# Patient Record
Sex: FEMALE | Race: WHITE | NOT HISPANIC OR LATINO | URBAN - METROPOLITAN AREA
[De-identification: names, ages, dates, MRNs, and addresses within clinical notes are randomized per-mention and may not be internally consistent; named-entity substitution may affect disease eponyms.]

---

## 2020-02-21 ENCOUNTER — NEW PATIENT (OUTPATIENT)
Dept: URBAN - METROPOLITAN AREA CLINIC 79 | Facility: CLINIC | Age: 75
End: 2020-02-21

## 2020-02-21 DIAGNOSIS — H52.03: ICD-10-CM

## 2020-02-21 DIAGNOSIS — H25.13: ICD-10-CM

## 2020-02-21 DIAGNOSIS — H43.393: ICD-10-CM

## 2020-02-21 DIAGNOSIS — H52.4: ICD-10-CM

## 2020-02-21 PROCEDURE — 92015 DETERMINE REFRACTIVE STATE: CPT

## 2020-02-21 PROCEDURE — 92004 COMPRE OPH EXAM NEW PT 1/>: CPT

## 2020-02-21 ASSESSMENT — VISUAL ACUITY
OD_PH: 20/20-2
OS_PH: 20/25
OD_SC: 20/40-2
OS_SC: 20/50+1
OS_CC: J2
OD_CC: J2

## 2020-02-21 ASSESSMENT — TONOMETRY
OD_IOP_MMHG: 14
OS_IOP_MMHG: 15

## 2021-03-15 ENCOUNTER — ESTABLISHED COMPREHENSIVE EXAM (OUTPATIENT)
Dept: URBAN - METROPOLITAN AREA CLINIC 79 | Facility: CLINIC | Age: 76
End: 2021-03-15

## 2021-03-15 VITALS — HEIGHT: 55 IN

## 2021-03-15 DIAGNOSIS — H52.4: ICD-10-CM

## 2021-03-15 DIAGNOSIS — H43.393: ICD-10-CM

## 2021-03-15 DIAGNOSIS — H40.013: ICD-10-CM

## 2021-03-15 DIAGNOSIS — H52.03: ICD-10-CM

## 2021-03-15 DIAGNOSIS — H25.13: ICD-10-CM

## 2021-03-15 PROCEDURE — 92133 CPTRZD OPH DX IMG PST SGM ON: CPT

## 2021-03-15 PROCEDURE — 92015 DETERMINE REFRACTIVE STATE: CPT

## 2021-03-15 PROCEDURE — 92014 COMPRE OPH EXAM EST PT 1/>: CPT

## 2021-03-15 PROCEDURE — MISCOPTOS MISCELLANEOUS, OPTOS

## 2021-03-15 ASSESSMENT — VISUAL ACUITY
OD_CC: J2
OS_CC: 20/20
OS_CC: J2
OD_CC: 20/20
OD_SC: 20/30-2
OS_SC: 20/30-2

## 2021-03-15 ASSESSMENT — TONOMETRY
OS_IOP_MMHG: 16
OD_IOP_MMHG: 16

## 2021-05-21 ENCOUNTER — PROBLEM (OUTPATIENT)
Dept: URBAN - METROPOLITAN AREA CLINIC 79 | Facility: CLINIC | Age: 76
End: 2021-05-21

## 2021-05-21 VITALS — HEIGHT: 55 IN

## 2021-05-21 DIAGNOSIS — H52.4: ICD-10-CM

## 2021-05-21 PROCEDURE — 92015 DETERMINE REFRACTIVE STATE: CPT | Mod: NC

## 2021-05-21 ASSESSMENT — VISUAL ACUITY
OD_CC: 20/40-2
OD_CC: 20/30-1
OS_CC: 20/25-2
OS_CC: 20/50-2

## 2024-12-26 ENCOUNTER — HOSPITAL ENCOUNTER (INPATIENT)
Dept: HOSPITAL 99 - 2 SOUTH | Age: 79
LOS: 7 days | Discharge: HOME HEALTH | DRG: 522 | End: 2025-01-02
Payer: OTHER GOVERNMENT

## 2024-12-26 VITALS — RESPIRATION RATE: 16 BRPM | SYSTOLIC BLOOD PRESSURE: 159 MMHG | DIASTOLIC BLOOD PRESSURE: 83 MMHG

## 2024-12-26 VITALS — RESPIRATION RATE: 18 BRPM | DIASTOLIC BLOOD PRESSURE: 81 MMHG | SYSTOLIC BLOOD PRESSURE: 139 MMHG

## 2024-12-26 VITALS — SYSTOLIC BLOOD PRESSURE: 126 MMHG | DIASTOLIC BLOOD PRESSURE: 81 MMHG | RESPIRATION RATE: 16 BRPM

## 2024-12-26 VITALS — SYSTOLIC BLOOD PRESSURE: 137 MMHG | RESPIRATION RATE: 18 BRPM | DIASTOLIC BLOOD PRESSURE: 69 MMHG

## 2024-12-26 VITALS — BODY MASS INDEX: 17.9 KG/M2

## 2024-12-26 VITALS — SYSTOLIC BLOOD PRESSURE: 154 MMHG | DIASTOLIC BLOOD PRESSURE: 71 MMHG

## 2024-12-26 VITALS — DIASTOLIC BLOOD PRESSURE: 71 MMHG | SYSTOLIC BLOOD PRESSURE: 148 MMHG

## 2024-12-26 VITALS — DIASTOLIC BLOOD PRESSURE: 83 MMHG | SYSTOLIC BLOOD PRESSURE: 159 MMHG
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
## 2024-12-26 DIAGNOSIS — Z88.8: ICD-10-CM

## 2024-12-26 DIAGNOSIS — C88.00: ICD-10-CM
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
## 2024-12-26 DIAGNOSIS — S72.011A: Primary | ICD-10-CM

## 2024-12-26 DIAGNOSIS — D62: ICD-10-CM
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
## 2024-12-26 DIAGNOSIS — R63.6: ICD-10-CM

## 2024-12-26 LAB
ALBUMIN SERPL-MCNC: 3.7 G/DL (ref 3.5–5)
ALP SERPL-CCNC: 106 U/L (ref 38–126)
ALT SERPL-CCNC: 17 U/L (ref 0–35)
APTT PPP: 25 SEC (ref 23.4–35)
AST SERPL-CCNC: 35 U/L (ref 14–36)
BUN SERPL-MCNC: 27 MG/DL (ref 7–17)
CALCIUM SERPL-MCNC: 9.3 MG/DL (ref 8.4–10.2)
CHLORIDE SERPL-SCNC: 105 MMOL/L (ref 98–107)
CO2 SERPL-SCNC: 27 MMOL/L (ref 22–30)
EGFR: > 60
ERYTHROCYTE [DISTWIDTH] IN BLOOD BY AUTOMATED COUNT: 12.1 % (ref 11.5–14.5)
ESTIMATED CREATININE CLEARANCE: 43 ML/MIN
GLUCOSE SERPL-MCNC: 93 MG/DL (ref 70–99)
HCT VFR BLD AUTO: 41 % (ref 37–47)
HGB BLD-MCNC: 13.7 G/DL (ref 12–16)
INR PPP: 0.9
MCHC RBC AUTO-ENTMCNC: 33.4 G/DL (ref 33–37)
MCV RBC AUTO: 92.3 FL (ref 81–99)
NRBC BLD AUTO-RTO: 0 %
PLATELET # BLD AUTO: 263 10^3/UL (ref 130–400)
POTASSIUM SERPL-SCNC: 4.3 MMOL/L (ref 3.5–5.1)
PROT SERPL-MCNC: 6.2 G/DL (ref 6.3–8.2)
PROTHROMBIN TIME: 12.6 SEC (ref 11.4–14.6)
SODIUM SERPL-SCNC: 139 MMOL/L (ref 135–145)

## 2024-12-26 PROCEDURE — C1776 JOINT DEVICE (IMPLANTABLE): HCPCS

## 2024-12-26 PROCEDURE — C1713 ANCHOR/SCREW BN/BN,TIS/BN: HCPCS

## 2024-12-26 RX ADMIN — ACETAMINOPHEN 650 MG: 325 TABLET ORAL at 14:06

## 2024-12-26 NOTE — PTCARENOTE
"Patient admitted from home through the Emergency room for right hip fracture.Patient fell last Thursday at home and came to the ER today with increasing pain in her right hip.She will have surgery tomorrow.She is alert and oriented and rates her "~"pain at an 8 out of 10.The patient is in her bed with the call bell in reach.Her  is at the bedside."

## 2024-12-26 NOTE — W.PN.UPDATE
"Update Note"~"Progress Note Update"~"-: "~"With R femoral neck fx"~"Will need surgery--Hemiarthroplasty"~"NPO for tomorrow"~"thanks"~"GGMD"

## 2024-12-27 VITALS — DIASTOLIC BLOOD PRESSURE: 93 MMHG | SYSTOLIC BLOOD PRESSURE: 170 MMHG | RESPIRATION RATE: 11 BRPM

## 2024-12-27 VITALS — OXYGEN SATURATION: 1 %

## 2024-12-27 VITALS — DIASTOLIC BLOOD PRESSURE: 90 MMHG | SYSTOLIC BLOOD PRESSURE: 166 MMHG | OXYGEN SATURATION: 1 %

## 2024-12-27 VITALS — RESPIRATION RATE: 15 BRPM | SYSTOLIC BLOOD PRESSURE: 165 MMHG | DIASTOLIC BLOOD PRESSURE: 89 MMHG

## 2024-12-27 VITALS — DIASTOLIC BLOOD PRESSURE: 86 MMHG | OXYGEN SATURATION: 2 % | RESPIRATION RATE: 15 BRPM

## 2024-12-27 VITALS — SYSTOLIC BLOOD PRESSURE: 132 MMHG | DIASTOLIC BLOOD PRESSURE: 71 MMHG | RESPIRATION RATE: 18 BRPM

## 2024-12-27 VITALS — RESPIRATION RATE: 16 BRPM | SYSTOLIC BLOOD PRESSURE: 121 MMHG | DIASTOLIC BLOOD PRESSURE: 62 MMHG

## 2024-12-27 VITALS — RESPIRATION RATE: 14 BRPM | DIASTOLIC BLOOD PRESSURE: 70 MMHG | SYSTOLIC BLOOD PRESSURE: 129 MMHG

## 2024-12-27 VITALS — RESPIRATION RATE: 15 BRPM | DIASTOLIC BLOOD PRESSURE: 97 MMHG | SYSTOLIC BLOOD PRESSURE: 170 MMHG

## 2024-12-27 VITALS — DIASTOLIC BLOOD PRESSURE: 95 MMHG | RESPIRATION RATE: 10 BRPM | SYSTOLIC BLOOD PRESSURE: 165 MMHG

## 2024-12-27 VITALS — DIASTOLIC BLOOD PRESSURE: 83 MMHG | SYSTOLIC BLOOD PRESSURE: 166 MMHG | RESPIRATION RATE: 11 BRPM

## 2024-12-27 VITALS — RESPIRATION RATE: 14 BRPM | SYSTOLIC BLOOD PRESSURE: 136 MMHG | DIASTOLIC BLOOD PRESSURE: 76 MMHG

## 2024-12-27 VITALS — RESPIRATION RATE: 18 BRPM | DIASTOLIC BLOOD PRESSURE: 81 MMHG | SYSTOLIC BLOOD PRESSURE: 159 MMHG

## 2024-12-27 VITALS — DIASTOLIC BLOOD PRESSURE: 80 MMHG | SYSTOLIC BLOOD PRESSURE: 149 MMHG | RESPIRATION RATE: 18 BRPM

## 2024-12-27 VITALS — RESPIRATION RATE: 10 BRPM

## 2024-12-27 PROCEDURE — 0SRR0J9 REPLACEMENT OF RIGHT HIP JOINT, FEMORAL SURFACE WITH SYNTHETIC SUBSTITUTE, CEMENTED, OPEN APPROACH: ICD-10-PCS | Performed by: ORTHOPAEDIC SURGERY

## 2024-12-27 RX ADMIN — ONDANSETRON HYDROCHLORIDE 4 MG: 2 SOLUTION INTRAMUSCULAR; INTRAVENOUS at 15:29

## 2024-12-27 RX ADMIN — CEFAZOLIN 5: 10 INJECTION, POWDER, FOR SOLUTION INTRAVENOUS at 21:18

## 2024-12-27 RX ADMIN — ACETAMINOPHEN 650 MG: 325 TABLET ORAL at 04:47

## 2024-12-27 RX ADMIN — PROCHLORPERAZINE EDISYLATE 5 MG: 5 INJECTION INTRAMUSCULAR; INTRAVENOUS at 15:52

## 2024-12-27 RX ADMIN — DOCUSATE SODIUM: 100 CAPSULE, LIQUID FILLED ORAL at 21:17

## 2024-12-27 RX ADMIN — DOCUSATE SODIUM: 100 CAPSULE, LIQUID FILLED ORAL at 21:21

## 2024-12-27 RX ADMIN — ACETAMINOPHEN 650 MG: 325 TABLET ORAL at 00:25

## 2024-12-27 RX ADMIN — SODIUM CHLORIDE, SODIUM ACETATE ANHYDROUS, SODIUM GLUCONATE, POTASSIUM CHLORIDE, AND MAGNESIUM CHLORIDE 1000: 526; 222; 502; 37; 30 INJECTION, SOLUTION INTRAVENOUS at 16:58

## 2024-12-27 NOTE — CM
"Met with pt at bedside"~"Pt reports she lives with her  in a 1 story home; 2 steps to enter"~"Independent, employed FT, drives"~"DME - rolling walker"~"SNF/HH - denies past hx"~"Has ride at discharge"~"PCP - Jocelyne Dietrich"~"Pharm - Dex "~"For OR today for repair of hip fx"~"Discussed SNF vs HH at discharge"~"PT/OT evals pending post-op"~"Plan - TBD based on pt needs postop "

## 2024-12-27 NOTE — PTCARENOTE
"received pt back from PACU.  pt is drowsy, responds to verbal stimuli with stable vitals.  pt presents on 2L NC.  care/assessment provided upon coming back to unit.  2 primaseal dressing present on right hip; no shadowing noted on 2nd outermost "~"dressing upon arrival to the floor.  fluids initiated/infusing per order.  Neuro checks preformed; see charting. care plan continues to be followed. PACU RN touched base with spouse via phone."

## 2024-12-28 VITALS — DIASTOLIC BLOOD PRESSURE: 64 MMHG | HEART RATE: 100 BPM | SYSTOLIC BLOOD PRESSURE: 101 MMHG

## 2024-12-28 VITALS — DIASTOLIC BLOOD PRESSURE: 63 MMHG | SYSTOLIC BLOOD PRESSURE: 115 MMHG | RESPIRATION RATE: 18 BRPM

## 2024-12-28 VITALS — RESPIRATION RATE: 18 BRPM | DIASTOLIC BLOOD PRESSURE: 69 MMHG | SYSTOLIC BLOOD PRESSURE: 127 MMHG

## 2024-12-28 VITALS — SYSTOLIC BLOOD PRESSURE: 113 MMHG | RESPIRATION RATE: 16 BRPM | DIASTOLIC BLOOD PRESSURE: 60 MMHG

## 2024-12-28 VITALS — RESPIRATION RATE: 16 BRPM | SYSTOLIC BLOOD PRESSURE: 120 MMHG | DIASTOLIC BLOOD PRESSURE: 65 MMHG

## 2024-12-28 VITALS — SYSTOLIC BLOOD PRESSURE: 101 MMHG | DIASTOLIC BLOOD PRESSURE: 61 MMHG | RESPIRATION RATE: 18 BRPM

## 2024-12-28 VITALS — SYSTOLIC BLOOD PRESSURE: 138 MMHG | DIASTOLIC BLOOD PRESSURE: 72 MMHG | RESPIRATION RATE: 16 BRPM

## 2024-12-28 LAB
BUN SERPL-MCNC: 27 MG/DL (ref 7–17)
CALCIUM SERPL-MCNC: 8.5 MG/DL (ref 8.4–10.2)
CHLORIDE SERPL-SCNC: 101 MMOL/L (ref 98–107)
CO2 SERPL-SCNC: 30 MMOL/L (ref 22–30)
EGFR: 57.31
ESTIMATED CREATININE CLEARANCE: 34 ML/MIN
GLUCOSE SERPL-MCNC: 133 MG/DL (ref 70–99)
HCT VFR BLD AUTO: 34.8 % (ref 37–47)
HGB BLD-MCNC: 11.6 G/DL (ref 12–16)
POTASSIUM SERPL-SCNC: 4.1 MMOL/L (ref 3.5–5.1)
SODIUM SERPL-SCNC: 138 MMOL/L (ref 135–145)

## 2024-12-28 RX ADMIN — CEFAZOLIN 5: 10 INJECTION, POWDER, FOR SOLUTION INTRAVENOUS at 05:56

## 2024-12-28 RX ADMIN — ACETAMINOPHEN 650 MG: 325 TABLET ORAL at 10:44

## 2024-12-28 RX ADMIN — DOCUSATE SODIUM 100 MG: 100 CAPSULE, LIQUID FILLED ORAL at 07:55

## 2024-12-28 RX ADMIN — SODIUM CHLORIDE, SODIUM ACETATE ANHYDROUS, SODIUM GLUCONATE, POTASSIUM CHLORIDE, AND MAGNESIUM CHLORIDE: 526; 222; 502; 37; 30 INJECTION, SOLUTION INTRAVENOUS at 15:28

## 2024-12-28 RX ADMIN — ENOXAPARIN SODIUM 30 MG: 30 INJECTION SUBCUTANEOUS at 07:56

## 2024-12-28 RX ADMIN — SODIUM CHLORIDE, SODIUM ACETATE ANHYDROUS, SODIUM GLUCONATE, POTASSIUM CHLORIDE, AND MAGNESIUM CHLORIDE 1000: 526; 222; 502; 37; 30 INJECTION, SOLUTION INTRAVENOUS at 01:53

## 2024-12-28 RX ADMIN — DOCUSATE SODIUM: 100 CAPSULE, LIQUID FILLED ORAL at 21:27

## 2024-12-28 RX ADMIN — CEFAZOLIN 5: 10 INJECTION, POWDER, FOR SOLUTION INTRAVENOUS at 15:27

## 2024-12-28 NOTE — W.PN.UPDATE
"Update Note"~"Progress Note Update"~"-: "~"Comfortable"~"VSS"~"Dressing intact"~"R LE NVI"~"Xrays look well"~"Rec;"~"PT/POT"~"Rehab placement if needed"~"Should have DVT prophylaxis for ~4 weeks"~"Lovenox best"~"Have F/U with me in about 2 weeks for suture removal"~"thanks"~"GGMD" Fall Risk

## 2024-12-28 NOTE — CM
"Reviewed the chart notes and spoke with the patient and her spouse at the bedside.  Discussed SNF recommendations.  Referrals sent via Care Port along with [facility].  CM continues to be available to patient/family and is monitoring medical plan for "~"needs at discharge."~"Plan:  Discharge to SNF/rehab once bed secured.  No precert required."

## 2024-12-29 VITALS — DIASTOLIC BLOOD PRESSURE: 60 MMHG | SYSTOLIC BLOOD PRESSURE: 101 MMHG | HEART RATE: 109 BPM | OXYGEN SATURATION: 99 %

## 2024-12-29 VITALS — DIASTOLIC BLOOD PRESSURE: 71 MMHG | SYSTOLIC BLOOD PRESSURE: 129 MMHG | RESPIRATION RATE: 14 BRPM

## 2024-12-29 VITALS — RESPIRATION RATE: 20 BRPM | SYSTOLIC BLOOD PRESSURE: 95 MMHG | DIASTOLIC BLOOD PRESSURE: 66 MMHG

## 2024-12-29 VITALS — RESPIRATION RATE: 18 BRPM | SYSTOLIC BLOOD PRESSURE: 129 MMHG | DIASTOLIC BLOOD PRESSURE: 66 MMHG

## 2024-12-29 RX ADMIN — DOCUSATE SODIUM: 100 CAPSULE, LIQUID FILLED ORAL at 21:52

## 2024-12-29 RX ADMIN — ACETAMINOPHEN 650 MG: 325 TABLET ORAL at 18:13

## 2024-12-29 RX ADMIN — DOCUSATE SODIUM 100 MG: 100 CAPSULE, LIQUID FILLED ORAL at 10:11

## 2024-12-29 RX ADMIN — ACETAMINOPHEN 650 MG: 325 TABLET ORAL at 10:10

## 2024-12-29 RX ADMIN — ENOXAPARIN SODIUM 30 MG: 30 INJECTION SUBCUTANEOUS at 10:11

## 2024-12-29 NOTE — CM
"Received an e-mailed copy of the patient' primary insurance of [employer], a  managed plan.  Spoke with admissions, they do not need the back of card.  Admissions changed Medicare as primary to secondary.  Per spouse, they can not "~"bill Medicare or  for hospitalization or services.  CM continues to be available to patient/family and is monitoring medical plan for needs at discharge."~"Plan:  Discharge to SNF/rehab once bed secured and auth obtained since Medicare is not the primary.  "

## 2024-12-30 VITALS — DIASTOLIC BLOOD PRESSURE: 73 MMHG | SYSTOLIC BLOOD PRESSURE: 124 MMHG | RESPIRATION RATE: 16 BRPM

## 2024-12-30 VITALS — DIASTOLIC BLOOD PRESSURE: 72 MMHG | SYSTOLIC BLOOD PRESSURE: 129 MMHG | HEART RATE: 115 BPM

## 2024-12-30 VITALS — RESPIRATION RATE: 16 BRPM | SYSTOLIC BLOOD PRESSURE: 147 MMHG | DIASTOLIC BLOOD PRESSURE: 85 MMHG

## 2024-12-30 VITALS — SYSTOLIC BLOOD PRESSURE: 124 MMHG | OXYGEN SATURATION: 97 % | HEART RATE: 117 BPM | DIASTOLIC BLOOD PRESSURE: 73 MMHG

## 2024-12-30 VITALS — RESPIRATION RATE: 16 BRPM | DIASTOLIC BLOOD PRESSURE: 63 MMHG | SYSTOLIC BLOOD PRESSURE: 119 MMHG

## 2024-12-30 LAB
HCT VFR BLD AUTO: 33.7 % (ref 37–47)
HGB BLD-MCNC: 11.1 G/DL (ref 12–16)

## 2024-12-30 RX ADMIN — DOCUSATE SODIUM: 100 CAPSULE, LIQUID FILLED ORAL at 08:38

## 2024-12-30 RX ADMIN — ACETAMINOPHEN 650 MG: 325 TABLET ORAL at 20:57

## 2024-12-30 RX ADMIN — DOCUSATE SODIUM: 100 CAPSULE, LIQUID FILLED ORAL at 20:49

## 2024-12-30 RX ADMIN — ENOXAPARIN SODIUM 30 MG: 30 INJECTION SUBCUTANEOUS at 08:38

## 2024-12-30 NOTE — PTCARENOTE
Patient has no c/o pain, only tenderness when palpating right thigh. Patient ambulating to bathroom with supervision. patient refers to sit in chair during the day. Call bell in reach, spouse at bedside.

## 2024-12-30 NOTE — CM
"Met with pt and her  at bedside"~"Discussed accepting facility's - preference is [facility]"~"Will review insurance to check within network"~"Plan - anticipate SNF when bed obtained and medically ready"

## 2024-12-31 VITALS — DIASTOLIC BLOOD PRESSURE: 70 MMHG | RESPIRATION RATE: 18 BRPM | SYSTOLIC BLOOD PRESSURE: 112 MMHG

## 2024-12-31 VITALS — RESPIRATION RATE: 18 BRPM | DIASTOLIC BLOOD PRESSURE: 73 MMHG | SYSTOLIC BLOOD PRESSURE: 134 MMHG

## 2024-12-31 VITALS — HEART RATE: 106 BPM | SYSTOLIC BLOOD PRESSURE: 125 MMHG | DIASTOLIC BLOOD PRESSURE: 79 MMHG

## 2024-12-31 VITALS — SYSTOLIC BLOOD PRESSURE: 136 MMHG | DIASTOLIC BLOOD PRESSURE: 83 MMHG | RESPIRATION RATE: 17 BRPM

## 2024-12-31 LAB
BUN SERPL-MCNC: 24 MG/DL (ref 7–17)
CALCIUM SERPL-MCNC: 8.8 MG/DL (ref 8.4–10.2)
CHLORIDE SERPL-SCNC: 101 MMOL/L (ref 98–107)
CO2 SERPL-SCNC: 31 MMOL/L (ref 22–30)
EGFR: > 60
ERYTHROCYTE [DISTWIDTH] IN BLOOD BY AUTOMATED COUNT: 12.7 % (ref 11.5–14.5)
ESTIMATED CREATININE CLEARANCE: 38 ML/MIN
GLUCOSE SERPL-MCNC: 96 MG/DL (ref 70–99)
HCT VFR BLD AUTO: 31.5 % (ref 37–47)
HGB BLD-MCNC: 10.3 G/DL (ref 12–16)
MCHC RBC AUTO-ENTMCNC: 32.7 G/DL (ref 33–37)
MCV RBC AUTO: 94.9 FL (ref 81–99)
PLATELET # BLD AUTO: 351 10^3/UL (ref 130–400)
POTASSIUM SERPL-SCNC: 4 MMOL/L (ref 3.5–5.1)
SODIUM SERPL-SCNC: 139 MMOL/L (ref 135–145)

## 2024-12-31 RX ADMIN — DOCUSATE SODIUM 100 MG: 100 CAPSULE, LIQUID FILLED ORAL at 20:17

## 2024-12-31 RX ADMIN — ACETAMINOPHEN 650 MG: 325 TABLET ORAL at 20:17

## 2024-12-31 RX ADMIN — ENOXAPARIN SODIUM 30 MG: 30 INJECTION SUBCUTANEOUS at 08:15

## 2024-12-31 RX ADMIN — DOCUSATE SODIUM: 100 CAPSULE, LIQUID FILLED ORAL at 08:11

## 2024-12-31 NOTE — CM
"Chart reviewed and met with pt"~"Spoke with Jessy at [facility] [phone]"~"Per Jessy Burgess does not cover SNF"~"Pt/ updated - discussed poss private pay or having secondary insurance pay"~"No bed today at [facility]"~"Plan - anticipate snf vs HH when medically stable"

## 2024-12-31 NOTE — PN.CDI
"CDI"~"- -"~"CDI: "~"Physician Documentation Request"~"Admit Date: 12/26/24 14:52"~"Dear Doctor Patience,"~"Please review the following and provide your response in the progress notes. "~"Clinical Indicators:  "~"	Height: 5' 4'"~"	Weight: 104 lbs"~"	BMI: 17.9"~"If possible, please provide an associated diagnosis related to the abnormal BMI, such as:"~"	Underweight"~"	Cachectic"~"	BMI is not significant"~"	Other"~"BMI &lt; or = to 19.9"~"Underweight"~"Weight Loss"~"Cachectic"~"Anorexia"~"	"~"Use of terms such as suspected, likely, concern for, or probable (associated with a specific diagnosis that is being evaluated, monitored, or treated as if it exists) are acceptable and can be coded in the inpatient setting, when documented at the "~"time of discharge. "~"Thank you, "~"Karlie Fitch RN, BSN"~"CDI Specialist"~"Available via Tiger text"~"Please use your independent medical judgment in providing your response."

## 2025-01-01 VITALS — RESPIRATION RATE: 18 BRPM | SYSTOLIC BLOOD PRESSURE: 127 MMHG | DIASTOLIC BLOOD PRESSURE: 77 MMHG

## 2025-01-01 VITALS — SYSTOLIC BLOOD PRESSURE: 143 MMHG | RESPIRATION RATE: 16 BRPM | DIASTOLIC BLOOD PRESSURE: 76 MMHG

## 2025-01-01 VITALS — SYSTOLIC BLOOD PRESSURE: 116 MMHG | RESPIRATION RATE: 16 BRPM | DIASTOLIC BLOOD PRESSURE: 69 MMHG

## 2025-01-01 VITALS — DIASTOLIC BLOOD PRESSURE: 63 MMHG | OXYGEN SATURATION: 100 % | HEART RATE: 110 BPM | SYSTOLIC BLOOD PRESSURE: 114 MMHG

## 2025-01-01 LAB
HCT VFR BLD AUTO: 32 % (ref 37–47)
HGB BLD-MCNC: 10.8 G/DL (ref 12–16)

## 2025-01-01 RX ADMIN — DOCUSATE SODIUM: 100 CAPSULE, LIQUID FILLED ORAL at 21:40

## 2025-01-01 RX ADMIN — ACETAMINOPHEN 650 MG: 325 TABLET ORAL at 21:38

## 2025-01-01 RX ADMIN — ENOXAPARIN SODIUM 30 MG: 30 INJECTION SUBCUTANEOUS at 08:59

## 2025-01-01 RX ADMIN — DOCUSATE SODIUM: 100 CAPSULE, LIQUID FILLED ORAL at 09:00

## 2025-01-02 VITALS — SYSTOLIC BLOOD PRESSURE: 119 MMHG | RESPIRATION RATE: 18 BRPM | DIASTOLIC BLOOD PRESSURE: 75 MMHG

## 2025-01-02 VITALS — RESPIRATION RATE: 16 BRPM | DIASTOLIC BLOOD PRESSURE: 81 MMHG | SYSTOLIC BLOOD PRESSURE: 119 MMHG

## 2025-01-02 LAB
HCT VFR BLD AUTO: 33.3 % (ref 37–47)
HGB BLD-MCNC: 11.2 G/DL (ref 12–16)

## 2025-01-02 RX ADMIN — DOCUSATE SODIUM: 100 CAPSULE, LIQUID FILLED ORAL at 07:53

## 2025-01-02 NOTE — VNURNOTE
"Home Health Liaison met with patient at bedside to discuss [facility] nurse/therapy, visits, schedule and homebound status. Patient is agreeable and understands that visits at home will be 2-3 x per week to assess and teach medical management. [facility] "~"brochure provided with contact information. Patient is aware that [facility] will contact them for start of care in 1-2 days after discharge from ."~"[facility] referral completed in Care Port."

## 2025-01-02 NOTE — CM
"Addendum entered by John Hart  01/02/25 13:37: "~"Discharge order noted. Pt is aware and she stated her  will transport home. "~"[facility] liaison met with the pt. Pt is accepted for VN services. "~"D/C plan: home with [facility] and family support.  to transport. "~"Original Note:"~"CM following re: discharge planning. "~"Reviewed pt's chart, met with pt. "~"PT and OT updated evaluations noted - home PT/OT recommended. Pt is aware, expressed her agreement. A list of VN vendors provided, pt preferred [facility]. A referral to [facility] made. "~"Pt reports she lives with  at [facility], has no children. Pt reports she has a walker at home and she was working full time as a . "~"Pt has Medicare insurance as secondary. IMM reviewed, placed on chart, pt has a copy. "~"D/C plan: home  with [facility] and family support.  to transport at discharge. "~"CM will follow to assist pt with discharge plan updates as needed. "